# Patient Record
Sex: FEMALE | Race: WHITE | NOT HISPANIC OR LATINO | Employment: UNEMPLOYED | RURAL
[De-identification: names, ages, dates, MRNs, and addresses within clinical notes are randomized per-mention and may not be internally consistent; named-entity substitution may affect disease eponyms.]

---

## 2018-11-28 ENCOUNTER — OFFICE VISIT (OUTPATIENT)
Dept: OBSTETRICS AND GYNECOLOGY | Facility: CLINIC | Age: 73
End: 2018-11-28

## 2018-11-28 VITALS
HEIGHT: 64 IN | HEART RATE: 89 BPM | DIASTOLIC BLOOD PRESSURE: 82 MMHG | SYSTOLIC BLOOD PRESSURE: 150 MMHG | BODY MASS INDEX: 23.05 KG/M2 | WEIGHT: 135 LBS

## 2018-11-28 DIAGNOSIS — B96.89 BV (BACTERIAL VAGINOSIS): Primary | ICD-10-CM

## 2018-11-28 DIAGNOSIS — Z46.89 ENCOUNTER FOR FITTING AND ADJUSTMENT OF PESSARY: ICD-10-CM

## 2018-11-28 DIAGNOSIS — N95.2 VAGINAL ATROPHY: ICD-10-CM

## 2018-11-28 DIAGNOSIS — N76.0 BV (BACTERIAL VAGINOSIS): Primary | ICD-10-CM

## 2018-11-28 PROCEDURE — 99203 OFFICE O/P NEW LOW 30 MIN: CPT | Performed by: OBSTETRICS & GYNECOLOGY

## 2018-11-28 RX ORDER — ATENOLOL 25 MG/1
25 TABLET ORAL 2 TIMES DAILY
COMMUNITY

## 2018-11-28 RX ORDER — METRONIDAZOLE 500 MG/1
500 TABLET ORAL 3 TIMES DAILY
Qty: 21 TABLET | Refills: 0 | Status: SHIPPED | OUTPATIENT
Start: 2018-11-28 | End: 2018-12-05

## 2018-11-28 RX ORDER — HYDROCHLOROTHIAZIDE 50 MG/1
50 TABLET ORAL DAILY
COMMUNITY

## 2018-11-28 RX ORDER — FLUCONAZOLE 150 MG/1
TABLET ORAL
Qty: 2 TABLET | Refills: 12 | Status: SHIPPED | OUTPATIENT
Start: 2018-11-28 | End: 2019-09-11

## 2018-11-28 RX ORDER — IBUPROFEN 200 MG
200 TABLET ORAL EVERY 6 HOURS PRN
COMMUNITY

## 2018-11-28 RX ORDER — DIPHENHYDRAMINE HCL 25 MG
25 CAPSULE ORAL NIGHTLY PRN
COMMUNITY

## 2018-11-28 NOTE — PROGRESS NOTES
Leola Rodriguez is a 73 y.o. y/o female.     Chief Complaint: Establish care, manage pessary    HPI:   73 y.o. y/o .  No LMP recorded..  Menarche age:15    Past medical history is significant for hypertension.    Past surgical history includes total abdominal hysterectomy and bilateral salpingo-oophorectomy and then bladder sling by Dr. Caicedo.    She is  and retired.    She does not smoke or use smokeless tobacco, and she never has.    Family history significant for mother dying at age 86 of a heart attack.  Father  at age 82 of heart disease.  Brother dying at age 70 of a stroke.  No family history of breast cancer, uterine cancer, ovarian cancer, or colon cancer.    Her extensive OB/GYN records were reviewed in detail.    2018, she currently utilizes a #4 ring pessary without support.  They're 1 that she uses is very soft and flexible and she can remove it and inserted herself.   She has been having lots problems with constipation.  She usually takes the pessary out to have a bowel movement.  She is noted to have some bacterial vaginosis.  Her sling is palpable, but there is no evidence of erosion.  It is most superficial on her right side.  This was noticed back in 2012.  BV will be treated with Flagyl and Diflucan.  I use some Premarin vaginal cream to insert her pessary, and I gave her samples of the same.  She states that she cannot afford a prescription for estrogen vaginal cream.  I will see her back in about 6 weeks, and we may try a slightly larger pessary at that time.     Obstetric History  #: 1, Date: , Sex: None, Weight: None, GA: None, Delivery: Vaginal, Spontaneous, Apgar1: None, Apgar5: None, Living: None, Birth Comments: None      Review of Systems   Constitutional: Negative for activity change, appetite change, chills, diaphoresis, fatigue, fever and unexpected weight change.   Gastrointestinal: Negative for abdominal pain, constipation, diarrhea and  nausea.   Genitourinary: Negative for difficulty urinating, dysuria, menstrual problem, pelvic pain, urgency, vaginal bleeding, vaginal discharge and vaginal pain.   Neurological: Negative for headaches.   Psychiatric/Behavioral: Negative for dysphoric mood. The patient is not nervous/anxious.    All other systems reviewed and are negative.     Breast ROS: negative    The following portions of the patient's history were reviewed and updated as appropriate: allergies, current medications, past family history, past medical history, past social history, past surgical history and problem list.    Allergies   Allergen Reactions   • Butorphanol GI Intolerance   • Hydrocodone-Acetaminophen Nausea Only   • Meperidine Nausea Only   • Morphine GI Intolerance   • Diazepam Rash        Outpatient Medications Prior to Visit   Medication Sig Dispense Refill   • atenolol (TENORMIN) 25 MG tablet Take 25 mg by mouth 2 (Two) Times a Day.     • Cranberry 1000 MG capsule cranberry   1200 mg qd     • diphenhydrAMINE (BENADRYL) 25 mg capsule Take 25 mg by mouth At Night As Needed for Itching.     • hydrochlorothiazide (HYDRODIURIL) 50 MG tablet Take 50 mg by mouth Daily. Takes half     • ibuprofen (ADVIL,MOTRIN) 200 MG tablet Take 200 mg by mouth Every 6 (Six) Hours As Needed for Mild Pain .     • Multiple Vitamins-Minerals (WOMENS 50+ MULTI VITAMIN/MIN PO) Take  by mouth.       No facility-administered medications prior to visit.         The patient has a family history of   Family History   Problem Relation Age of Onset   • Alzheimer's disease Father    • Heart attack Mother    • Stroke Brother         Past Medical History:   Diagnosis Date   • Endometriosis    • Hypertension         OB History      Para Term  AB Living    1 1 1          SAB TAB Ectopic Molar Multiple Live Births                          Social History     Socioeconomic History   • Marital status:      Spouse name: Not on file   • Number of  "children: Not on file   • Years of education: Not on file   • Highest education level: Not on file   Social Needs   • Financial resource strain: Not on file   • Food insecurity - worry: Not on file   • Food insecurity - inability: Not on file   • Transportation needs - medical: Not on file   • Transportation needs - non-medical: Not on file   Occupational History   • Not on file   Tobacco Use   • Smoking status: Never Smoker   • Smokeless tobacco: Never Used   Substance and Sexual Activity   • Alcohol use: No     Frequency: Never   • Drug use: No   • Sexual activity: Not on file   Other Topics Concern   • Not on file   Social History Narrative   • Not on file        Past Surgical History:   Procedure Laterality Date   • AXILLARY LYMPH NODE BIOPSY/EXCISION Left 2011    From a cat scratch   • INCONTINENCE SURGERY     • TOTAL ABDOMINAL HYSTERECTOMY WITH SALPINGO OOPHORECTOMY          Patient Active Problem List   Diagnosis   • Vaginal atrophy   • Encounter for fitting and adjustment of pessary        Documented Vitals    11/28/18 1406   BP: 150/82   Pulse: 89   Weight: 61.2 kg (135 lb)   Height: 162.6 cm (64\")        Body mass index is 23.17 kg/m².    Physical Exam   Constitutional: She is oriented to person, place, and time. She appears well-developed and well-nourished. No distress.   Well-developed well-nourished white female weighing 135 pounds with BMI 23.2.  Blood pressure 150/82.  Pulse 89.   HENT:   Head: Normocephalic and atraumatic.   Eyes: Conjunctivae and EOM are normal. Pupils are equal, round, and reactive to light.   Neck: Normal range of motion. Neck supple. No JVD present. No tracheal deviation present. No thyromegaly present.   Cardiovascular: Normal rate, regular rhythm, normal heart sounds and intact distal pulses. Exam reveals no gallop and no friction rub.   No murmur heard.  Pulmonary/Chest: Effort normal and breath sounds normal. No stridor. No respiratory distress. She has no wheezes. She has " no rales. She exhibits no tenderness.   Abdominal: Soft. Bowel sounds are normal. She exhibits no distension and no mass. There is no tenderness. There is no rebound and no guarding. No hernia. Hernia confirmed negative in the right inguinal area and confirmed negative in the left inguinal area.   Genitourinary: No labial fusion. There is no rash, tenderness, lesion or injury on the right labia. There is no rash, tenderness, lesion or injury on the left labia. No tenderness or bleeding in the vagina. Vaginal discharge found.   Genitourinary Comments: #4 ring pessary without support was removed and cleaned.  No vaginal bleeding was noted.  Bacterial vaginosis was noted.  Urethrovesical angle fairly well supported with sling.  Vaginal atrophy.  Pessary with Premarin vaginal cream was replaced without difficulty.    Musculoskeletal: Normal range of motion. She exhibits no edema, tenderness or deformity.   Lymphadenopathy:     She has no cervical adenopathy. No inguinal adenopathy noted on the right or left side.   Neurological: She is alert and oriented to person, place, and time. She has normal reflexes. She displays normal reflexes. No cranial nerve deficit. She exhibits normal muscle tone. Coordination normal.   Skin: Skin is warm and dry. No rash noted. She is not diaphoretic. No erythema. No pallor.   Psychiatric: She has a normal mood and affect. Her behavior is normal. Judgment and thought content normal.   Nursing note and vitals reviewed.     Assessment        Diagnosis Plan   1. BV (bacterial vaginosis)     2. Vaginal atrophy     3. Encounter for fitting and adjustment of pessary           Plan         New Medications Ordered This Visit   Medications   • metroNIDAZOLE (FLAGYL) 500 MG tablet     Sig: Take 1 tablet by mouth 3 (Three) Times a Day for 7 days.     Dispense:  21 tablet     Refill:  0   • fluconazole (DIFLUCAN) 150 MG tablet     Sig: Take one po today and take one po in 4 days.     Dispense:  2  tablet     Refill:  12     1. Flagyl and Diflucan.  2. Samples of Premarin vaginal cream.  3. Encouraged in diet and exercise.  4. Handouts on depression, hot flashes, exercise, and vitamin use.   5. Follow-up in 6 weeks.  Follow-up sooner as needed.            This document has been electronically signed by Doni Irvin MD on November 28, 2018 3:01 PM

## 2019-03-01 ENCOUNTER — OFFICE VISIT (OUTPATIENT)
Dept: OBSTETRICS AND GYNECOLOGY | Facility: CLINIC | Age: 74
End: 2019-03-01

## 2019-03-01 VITALS
SYSTOLIC BLOOD PRESSURE: 169 MMHG | HEIGHT: 64 IN | DIASTOLIC BLOOD PRESSURE: 92 MMHG | BODY MASS INDEX: 23.22 KG/M2 | WEIGHT: 136 LBS | HEART RATE: 66 BPM

## 2019-03-01 DIAGNOSIS — N81.11 PROLAPSE OF VAGINAL WALL WITH MIDLINE CYSTOCELE: ICD-10-CM

## 2019-03-01 DIAGNOSIS — Z46.89 ENCOUNTER FOR FITTING AND ADJUSTMENT OF PESSARY: Primary | ICD-10-CM

## 2019-03-01 PROCEDURE — 99213 OFFICE O/P EST LOW 20 MIN: CPT | Performed by: NURSE PRACTITIONER

## 2019-03-01 PROCEDURE — A4561 PESSARY RUBBER, ANY TYPE: HCPCS | Performed by: NURSE PRACTITIONER

## 2019-03-01 PROCEDURE — 57160 INSERT PESSARY/OTHER DEVICE: CPT | Performed by: NURSE PRACTITIONER

## 2019-03-01 NOTE — PROGRESS NOTES
"Subjective   Chief Complaint   Patient presents with   • Pessary Check     Leola Rodriguez is a 74 y.o. year old who presents to be seen for follow-up of her pessary.  Currently she is using Foldable ring w/o support - #4 w/o urethral bar.  She reports increased vaginal pressure recently and it feels like her pessary is coming out with any type of activity.    No Additional Complaints Reported    The following portions of the patient's history were reviewed and updated as appropriate:problem list, current medications, allergies and past surgical history    Review of Systems   Constitutional: Negative for unexpected weight gain and unexpected weight loss.   Gastrointestinal: Positive for constipation. Negative for abdominal distention, abdominal pain, blood in stool and diarrhea.   Genitourinary: Negative for urinary incontinence, decreased urine volume, dysuria, frequency, pelvic pain, urgency, vaginal bleeding, vaginal discharge and vaginal pain.        Objective   /92   Pulse 66   Ht 162.6 cm (64\")   Wt 61.7 kg (136 lb)   Breastfeeding? No   BMI 23.34 kg/m²     General:  well developed; well nourished  no acute distress  appears stated age   Pelvis: Clinical staff was present for exam  External genitalia:  normal appearance of the external genitalia including Bartholin's and [place]'s glands.  :  urethral meatus normal;  Vaginal:  atrophic mucosal changes are present;  Cervix:  absent.  Uterus:  absent.  Adnexa:  absent, bilateral.  Rectal:  digital rectal exam not performed; anus visually normal appearing. external hemorrhoids present;  Cystocele GRADE 3  Vaginal vault prolapse GRADE 2   #4 ring w/o support w/o knob was removed, cleaned and bagged up for pt to take home. A #5 ring w/o support w/o a knob was reinserted and tolerated very well.      Lab Review   No data reviewed    Imaging   No data reviewed         Diagnoses and all orders for this visit:    Encounter for fitting and adjustment of " pessary    Prolapse of vaginal wall with midline cystocele    Will keep her #4 for use if needed but for now will plan to use the #5. If she's having any trouble with this pessary then she was encouraged to return and we'll try a #4 w/o support with a knob. She is taking it out at home and cleaning it regularly. Continue using Premarin samples 2 times per week; pea sized amount on pessary with reinsertion. F/U as needed or in 6 months for recheck.    No orders of the defined types were placed in this encounter.      This document was electronically signed.    Chastity Blanco, MUNIR  March 1, 2019

## 2019-04-17 ENCOUNTER — TELEPHONE (OUTPATIENT)
Dept: OBSTETRICS AND GYNECOLOGY | Facility: CLINIC | Age: 74
End: 2019-04-17

## 2019-04-17 NOTE — TELEPHONE ENCOUNTER
----- Message from Lolita Perez MA sent at 4/17/2019  9:19 AM CDT -----  Mora, I am sending you this message because I am helping out at the  today with phone calls.  This pt last saw Chastity Blanco in March.  She left a msg this morning stating that she is having trouble with her pessary.  She is bleeding.  She would like someone to call her ASAP.  Thank you.

## 2019-04-17 NOTE — TELEPHONE ENCOUNTER
I talked to this patient and she was seen in dr. Cheng office yesterday, she has a bad uti.  We suggested she take her pessary out until she is seen.  The patient chose to wait until Friday 4/26/19 to be seen.  Her bleeding has stopped and she believes that it is from her uti.

## 2019-09-09 ENCOUNTER — LAB (OUTPATIENT)
Dept: LAB | Facility: HOSPITAL | Age: 74
End: 2019-09-09

## 2019-09-09 DIAGNOSIS — R30.0 DYSURIA: Primary | ICD-10-CM

## 2019-09-09 DIAGNOSIS — R30.0 DYSURIA: ICD-10-CM

## 2019-09-09 LAB
BACTERIA UR QL AUTO: ABNORMAL /HPF
HYALINE CASTS UR QL AUTO: ABNORMAL /LPF
RBC # UR: ABNORMAL /HPF
REF LAB TEST METHOD: ABNORMAL
SQUAMOUS #/AREA URNS HPF: ABNORMAL /HPF
WBC UR QL AUTO: ABNORMAL /HPF

## 2019-09-09 PROCEDURE — 87186 SC STD MICRODIL/AGAR DIL: CPT

## 2019-09-09 PROCEDURE — 87086 URINE CULTURE/COLONY COUNT: CPT

## 2019-09-09 PROCEDURE — 87077 CULTURE AEROBIC IDENTIFY: CPT

## 2019-09-09 PROCEDURE — 81015 MICROSCOPIC EXAM OF URINE: CPT

## 2019-09-11 ENCOUNTER — TELEPHONE (OUTPATIENT)
Dept: OBSTETRICS AND GYNECOLOGY | Facility: CLINIC | Age: 74
End: 2019-09-11

## 2019-09-11 LAB — BACTERIA SPEC AEROBE CULT: ABNORMAL

## 2019-09-11 RX ORDER — SULFAMETHOXAZOLE AND TRIMETHOPRIM 800; 160 MG/1; MG/1
1 TABLET ORAL 2 TIMES DAILY
Qty: 10 TABLET | Refills: 0 | Status: SHIPPED | OUTPATIENT
Start: 2019-09-11 | End: 2019-09-12 | Stop reason: SDUPTHER

## 2019-09-11 RX ORDER — FLUCONAZOLE 150 MG/1
TABLET ORAL
Qty: 2 TABLET | Refills: 0 | Status: SHIPPED | OUTPATIENT
Start: 2019-09-11 | End: 2019-09-12 | Stop reason: SDUPTHER

## 2019-09-11 NOTE — TELEPHONE ENCOUNTER
Pt left vm said her script wasn't there..tried to call patient back and there was n/a..please call [phone]

## 2019-09-11 NOTE — TELEPHONE ENCOUNTER
PT CALLED WANTING TO KNOW URINE CX RESULTS.  SHE FEELS LIKE SHE HAS A REALLY BAD UTI.  PLEASE RETURN HER CALL.  [phone].  THANK YOU.

## 2019-09-12 RX ORDER — SULFAMETHOXAZOLE AND TRIMETHOPRIM 800; 160 MG/1; MG/1
1 TABLET ORAL 2 TIMES DAILY
Qty: 10 TABLET | Refills: 0 | Status: SHIPPED | OUTPATIENT
Start: 2019-09-12 | End: 2019-09-17

## 2019-09-12 RX ORDER — FLUCONAZOLE 150 MG/1
TABLET ORAL
Qty: 2 TABLET | Refills: 0 | Status: SHIPPED | OUTPATIENT
Start: 2019-09-12

## 2019-10-02 ENCOUNTER — TELEPHONE (OUTPATIENT)
Dept: OBSTETRICS AND GYNECOLOGY | Facility: CLINIC | Age: 74
End: 2019-10-02

## 2019-10-03 NOTE — TELEPHONE ENCOUNTER
That's been a month ago. If she's having trouble with urination then she needs to leave another urine specimen. If it's burning or itching then she needs to do a vag panel too.

## 2019-10-04 ENCOUNTER — TELEPHONE (OUTPATIENT)
Dept: OBSTETRICS AND GYNECOLOGY | Facility: CLINIC | Age: 74
End: 2019-10-04

## 2019-10-04 ENCOUNTER — LAB (OUTPATIENT)
Dept: LAB | Facility: HOSPITAL | Age: 74
End: 2019-10-04

## 2019-10-04 DIAGNOSIS — R30.0 DYSURIA: ICD-10-CM

## 2019-10-04 DIAGNOSIS — R30.0 DYSURIA: Primary | ICD-10-CM

## 2019-10-04 PROCEDURE — 87086 URINE CULTURE/COLONY COUNT: CPT

## 2019-10-04 PROCEDURE — 81001 URINALYSIS AUTO W/SCOPE: CPT

## 2019-10-04 PROCEDURE — 87077 CULTURE AEROBIC IDENTIFY: CPT

## 2019-10-04 PROCEDURE — 87186 SC STD MICRODIL/AGAR DIL: CPT

## 2019-10-05 LAB
BACTERIA UR QL AUTO: ABNORMAL /HPF
BILIRUB UR QL STRIP: NEGATIVE
CLARITY UR: ABNORMAL
COLOR UR: ABNORMAL
GLUCOSE UR STRIP-MCNC: NEGATIVE MG/DL
HGB UR QL STRIP.AUTO: ABNORMAL
HYALINE CASTS UR QL AUTO: ABNORMAL /LPF
KETONES UR QL STRIP: NEGATIVE
LEUKOCYTE ESTERASE UR QL STRIP.AUTO: ABNORMAL
NITRITE UR QL STRIP: POSITIVE
PH UR STRIP.AUTO: <=5 [PH] (ref 5–8)
PROT UR QL STRIP: ABNORMAL
RBC # UR: ABNORMAL /HPF
REF LAB TEST METHOD: ABNORMAL
SP GR UR STRIP: 1.03 (ref 1–1.03)
SQUAMOUS #/AREA URNS HPF: ABNORMAL /HPF
UROBILINOGEN UR QL STRIP: ABNORMAL
WBC UR QL AUTO: ABNORMAL /HPF

## 2019-10-06 LAB — BACTERIA SPEC AEROBE CULT: ABNORMAL

## 2019-10-09 ENCOUNTER — TELEPHONE (OUTPATIENT)
Dept: OBSTETRICS AND GYNECOLOGY | Facility: CLINIC | Age: 74
End: 2019-10-09

## 2019-10-09 NOTE — TELEPHONE ENCOUNTER
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

## 2019-10-10 RX ORDER — CEPHALEXIN 500 MG/1
500 CAPSULE ORAL 4 TIMES DAILY
Qty: 12 CAPSULE | Refills: 0 | Status: SHIPPED | OUTPATIENT
Start: 2019-10-10 | End: 2019-10-11 | Stop reason: SDUPTHER

## 2019-10-11 RX ORDER — CEPHALEXIN 500 MG/1
500 CAPSULE ORAL 4 TIMES DAILY
Qty: 12 CAPSULE | Refills: 0 | Status: SHIPPED | OUTPATIENT
Start: 2019-10-11 | End: 2019-10-14

## 2022-03-23 ENCOUNTER — OFFICE VISIT (OUTPATIENT)
Dept: OBSTETRICS AND GYNECOLOGY | Facility: CLINIC | Age: 77
End: 2022-03-23

## 2022-03-23 ENCOUNTER — TELEPHONE (OUTPATIENT)
Dept: OBSTETRICS AND GYNECOLOGY | Facility: CLINIC | Age: 77
End: 2022-03-23

## 2022-03-23 VITALS
DIASTOLIC BLOOD PRESSURE: 70 MMHG | HEIGHT: 64 IN | SYSTOLIC BLOOD PRESSURE: 138 MMHG | BODY MASS INDEX: 23.22 KG/M2 | WEIGHT: 136 LBS

## 2022-03-23 DIAGNOSIS — N81.11 PROLAPSE OF VAGINAL WALL WITH MIDLINE CYSTOCELE: ICD-10-CM

## 2022-03-23 DIAGNOSIS — N95.2 VAGINAL ATROPHY: ICD-10-CM

## 2022-03-23 DIAGNOSIS — Z46.89 ENCOUNTER FOR FITTING AND ADJUSTMENT OF PESSARY: Primary | ICD-10-CM

## 2022-03-23 PROBLEM — I10 BENIGN ESSENTIAL HYPERTENSION: Status: ACTIVE | Noted: 2017-10-30

## 2022-03-23 PROBLEM — I34.1 MITRAL VALVE PROLAPSE: Status: ACTIVE | Noted: 2017-10-30

## 2022-03-23 PROBLEM — I10 HYPERTENSION: Status: ACTIVE | Noted: 2022-03-23

## 2022-03-23 PROBLEM — M19.90 OSTEOARTHRITIS: Status: ACTIVE | Noted: 2022-03-23

## 2022-03-23 PROBLEM — M81.0 OSTEOPOROSIS: Status: ACTIVE | Noted: 2022-03-23

## 2022-03-23 PROCEDURE — 99203 OFFICE O/P NEW LOW 30 MIN: CPT | Performed by: NURSE PRACTITIONER

## 2022-03-23 RX ORDER — CONJUGATED ESTROGENS 0.62 MG/G
CREAM VAGINAL
Qty: 30 G | Refills: 6 | Status: SHIPPED | OUTPATIENT
Start: 2022-03-23

## 2022-03-23 RX ORDER — MONTELUKAST SODIUM 10 MG/1
TABLET ORAL
COMMUNITY

## 2022-03-23 RX ORDER — GABAPENTIN 300 MG/1
CAPSULE ORAL
COMMUNITY

## 2022-03-23 RX ORDER — OMEGA-3 FATTY ACIDS CAP DELAYED RELEASE 1000 MG 1000 MG
CAPSULE DELAYED RELEASE ORAL
COMMUNITY

## 2022-03-23 NOTE — PROGRESS NOTES
"Subjective   Chief Complaint   Patient presents with   • Pessary Check     Leola Rodriguez is a 77 y.o. year old who presents to be seen for follow-up of her pessary.  Currently she is using Foldable ring w/o support - #5 w/o urethral bar.  She reports worsening pressure on her rectum. Has had to take out the pessary frequently recently to to the discomfort. Also struggling with constipation.    No Additional Complaints Reported    The following portions of the patient's history were reviewed and updated as appropriate:problem list, current medications and allergies    Social History    Tobacco Use      Smoking status: Never Smoker      Smokeless tobacco: Never Used    Review of Systems     Objective   /70   Ht 162.6 cm (64\")   Wt 61.7 kg (136 lb)   Breastfeeding No   BMI 23.34 kg/m²         General:  well developed; well nourished  no acute distress  appears stated age   Pelvis: Clinical staff was present for exam  External genitalia:  normal appearance of the external genitalia including Bartholin's and [place]'s glands.  :  urethral meatus normal; urethral hypermobility is absent.  Vaginal:  atrophic mucosal changes are present; vaginal walls are covered in pressure ulcers but they do not appear to be infected at this time.   Cervix:  absent.  Uterus:  absent.  Adnexa:  absent, bilateral.   #2 ring with support placed without difficulty and fit was appropriate.        Lab Review   No data reviewed    Imaging   No data reviewed         Pt concerned that she will not be able to get this device in and out on her own and wants one without support. Will RTC on Monday to switch out for a #2 or #3 ring w/o support.  New Medications Ordered This Visit   Medications   • conjugated estrogens (Premarin) 0.625 MG/GM vaginal cream     Sig: Insert 0.5g vaginally at bedtime 2 nights per week.     Dispense:  30 g     Refill:  6         Diagnoses and all orders for this visit:    Encounter for fitting and adjustment of " pessary    Prolapse of vaginal wall with midline cystocele    Vaginal atrophy    Other orders  -     montelukast (SINGULAIR) 10 MG tablet; montelukast 10 mg tablet   TAKE ONE TABLET BY MOUTH DAILY  -     gabapentin (NEURONTIN) 300 MG capsule; gabapentin 300 mg capsule   TAKE ONE CAPSULE BY MOUTH TWICE DAILY  -     Magnesium 100 MG capsule; magnesium  -     Omega-3 Fatty Acids (Fish Oil) 1000 MG capsule delayed-release; Fish Oil   2000mg 1 BID  -     Calcium Carbonate-Vit D-Min (Calcium 600+D3 Plus Minerals) 600-800 MG-UNIT chewable tablet; Calcium 600-D3 Plus   1 qd  -     beta carotene 91588 UNIT capsule; beta carotene   1 qd  -     conjugated estrogens (Premarin) 0.625 MG/GM vaginal cream; Insert 0.5g vaginally at bedtime 2 nights per week.      RTC Monday to replace pessary with desired type. RBA and potential s/e of medication reviewed.       This note was electronically signed.    Chastity Blanco, MUNIR  March 23, 2022

## 2022-03-23 NOTE — TELEPHONE ENCOUNTER
----- Message from Gerson Garber sent at 3/23/2022  9:24 AM CDT -----  Regarding: PESSARY ISSUES  THIS PATIENT IS TRYING TO COME IN TO SEE [facility] TODAY BECAUSE SHE IS HAVING PESSARY ISSUES AND FEELS LIKE HER BLADDER IS ABOUT TO DROP. SHE STATED THAT SHE CANT WAIT ANOTHER WEEK AND SHE DOESN'T WANT TO SEE KE TOMORROW. SHE SAYS [facility] KNOWS HER SITUATION. I WAS CALLING YOU GUYS TO SEE IF MICHELE CAN SEE HER TODAY, BUT NO ANSWER. IF YOU CAN PLEASE GIVE THIS PATIENT A CALL BACK.   THANKS

## 2022-03-28 ENCOUNTER — OFFICE VISIT (OUTPATIENT)
Dept: OBSTETRICS AND GYNECOLOGY | Facility: CLINIC | Age: 77
End: 2022-03-28

## 2022-03-28 VITALS
SYSTOLIC BLOOD PRESSURE: 120 MMHG | DIASTOLIC BLOOD PRESSURE: 76 MMHG | WEIGHT: 136 LBS | HEIGHT: 64 IN | BODY MASS INDEX: 23.22 KG/M2

## 2022-03-28 DIAGNOSIS — Z46.89 ENCOUNTER FOR FITTING AND ADJUSTMENT OF PESSARY: Primary | ICD-10-CM

## 2022-03-28 PROCEDURE — A4561 PESSARY RUBBER, ANY TYPE: HCPCS | Performed by: NURSE PRACTITIONER

## 2022-03-28 NOTE — PROGRESS NOTES
"Subjective   Chief Complaint   Patient presents with   • Pessary Check     Leola Rodriguez is a 77 y.o. year old who presents to be seen for follow-up of her pessary.  Currently she is using Foldable ring w/o support - #2 ring with support w/o urethral bar. She was here last week for her routine exam and we did not have the correct pessary size that we needed to change her to so she is back today to get that correct ring and size.     The following portions of the patient's history were reviewed and updated as appropriate:problem list, current medications and allergies    Social History    Tobacco Use      Smoking status: Never Smoker      Smokeless tobacco: Never Used    Review of Systems   Genitourinary: Positive for urinary incontinence. Negative for dysuria, pelvic pain, pelvic pressure, vaginal bleeding, vaginal discharge and vaginal pain.        Objective   /76   Ht 162.6 cm (64\")   Wt 61.7 kg (136 lb)   Breastfeeding No   BMI 23.34 kg/m²         General:  well developed; well nourished  no acute distress  appears stated age   Pelvis: Clinical staff was present for exam  External genitalia:  normal appearance of the external genitalia including Bartholin's and [place]'s glands.  :  urethral meatus normal; urethral hypermobility is absent.  Vaginal:  atrophic mucosal changes are present; vaginal walls are covered in pressure ulcers but they do not appear to be infected at this time.   Cervix:  absent.  Uterus:  absent.  Adnexa:  absent, bilateral.   #3 ring withou support placed without difficulty and fit was appropriate.        Lab Review   No data reviewed    Imaging   No data reviewed         Pt concerned that she will not be able to get this device in and out on her own and wants one without support. Will RTC on Monday to switch out for a #2 or #3 ring w/o support.  No orders of the defined types were placed in this encounter.        Diagnoses and all orders for this visit:    Encounter for " fitting and adjustment of pessary      RTC in 6 months for routine pessary maintenance. No charge visit today; only charge for pessary.      This note was electronically signed.    Chastity Blanco, APRN  March 28, 2022